# Patient Record
Sex: MALE | Race: WHITE | Employment: FULL TIME | RURAL
[De-identification: names, ages, dates, MRNs, and addresses within clinical notes are randomized per-mention and may not be internally consistent; named-entity substitution may affect disease eponyms.]

---

## 2020-07-14 ENCOUNTER — OFFICE VISIT (OUTPATIENT)
Dept: PRIMARY CARE CLINIC | Age: 50
End: 2020-07-14

## 2020-07-14 DIAGNOSIS — Z20.828 EXPOSURE TO SARS-ASSOCIATED CORONAVIRUS: Primary | ICD-10-CM

## 2020-07-14 NOTE — PROGRESS NOTES
Pt presents to the flu clinic today requesting a covid test. Pt denies symptoms but has had a possible exposure from work. He declined to be seen by a doctor today.  KT

## 2020-07-18 LAB — SARS-COV-2, NAA: NOT DETECTED

## 2020-08-11 ENCOUNTER — OFFICE VISIT (OUTPATIENT)
Dept: PRIMARY CARE CLINIC | Age: 50
End: 2020-08-11

## 2020-08-11 DIAGNOSIS — Z20.828 EXPOSURE TO SARS-ASSOCIATED CORONAVIRUS: Primary | ICD-10-CM

## 2020-08-11 NOTE — PROGRESS NOTES
Pt presents to the flu clinic for covid testing. 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 He denies sx and exposure at this time. He declined to see a doctor today.  KT

## 2020-08-13 LAB — SARS-COV-2, NAA: NOT DETECTED

## 2020-09-21 ENCOUNTER — OFFICE VISIT (OUTPATIENT)
Dept: PRIMARY CARE CLINIC | Age: 50
End: 2020-09-21

## 2020-09-21 DIAGNOSIS — Z20.828 EXPOSURE TO SARS-ASSOCIATED CORONAVIRUS: Primary | ICD-10-CM

## 2020-09-23 LAB — SARS-COV-2, NAA: NOT DETECTED

## 2020-09-30 PROBLEM — J30.9 CHRONIC ALLERGIC RHINITIS: Status: ACTIVE | Noted: 2020-09-30

## 2020-11-23 ENCOUNTER — OFFICE VISIT (OUTPATIENT)
Dept: PRIMARY CARE CLINIC | Age: 50
End: 2020-11-23

## 2020-11-23 DIAGNOSIS — Z20.822 ENCOUNTER FOR LABORATORY TESTING FOR COVID-19 VIRUS: Primary | ICD-10-CM

## 2020-11-25 ENCOUNTER — TELEPHONE (OUTPATIENT)
Dept: PRIMARY CARE CLINIC | Age: 50
End: 2020-11-25

## 2020-11-25 LAB — SARS-COV-2, NAA: NOT DETECTED

## 2020-12-14 ENCOUNTER — OFFICE VISIT (OUTPATIENT)
Dept: PRIMARY CARE CLINIC | Age: 50
End: 2020-12-14

## 2020-12-14 DIAGNOSIS — Z20.822 ENCOUNTER FOR LABORATORY TESTING FOR COVID-19 VIRUS: Primary | ICD-10-CM

## 2020-12-16 LAB — SARS-COV-2, NAA: NOT DETECTED

## 2021-01-06 ENCOUNTER — OFFICE VISIT (OUTPATIENT)
Dept: PRIMARY CARE CLINIC | Age: 51
End: 2021-01-06

## 2021-01-06 DIAGNOSIS — Z20.822 ENCOUNTER FOR LABORATORY TESTING FOR COVID-19 VIRUS: Primary | ICD-10-CM

## 2021-01-08 LAB — SARS-COV-2, NAA: NOT DETECTED

## 2021-02-04 ENCOUNTER — OFFICE VISIT (OUTPATIENT)
Dept: PRIMARY CARE CLINIC | Age: 51
End: 2021-02-04

## 2021-02-04 DIAGNOSIS — Z20.822 SUSPECTED 2019 NOVEL CORONAVIRUS INFECTION: ICD-10-CM

## 2021-02-04 DIAGNOSIS — Z11.59 SCREENING FOR VIRAL DISEASE: ICD-10-CM

## 2021-02-04 NOTE — PROGRESS NOTES
Pt presents to the flu clinic for covid testing. 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 He denies sx and exposure at this time. He declined to see a doctor today. KT        2021    Susan Don (:  1970) is a 48 y.o.  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 ASSESSMENT  Screening for COVID-19/ Viral disease    PLAN  POCT influenza testing - Not Indicated  COVID-19 sample collected and submitted. Patient given detailed CDC instructions contained within After Visit Summary. An  electronic signature was used to authenticate this note.     --[place]

## 2021-02-04 NOTE — PATIENT INSTRUCTIONS
Preventing the Spread of Coronavirus Disease 2019 in Homes and Residential Communities For the most recent information go to [email or web address] Prevention steps for People with confirmed or suspected COVID-19 (including persons under investigation) who do not need to be hospitalized 
and People with confirmed COVID-19 who were hospitalized and determined to be medically stable to go home Your healthcare provider and public health staff will evaluate whether you can be cared for at home. If it is determined that you do not need to be hospitalized and can be isolated at home, you will be monitored by staff from your local or state health department. You should follow the prevention steps below until a healthcare provider or local or state health department says you can return to your normal activities. Stay home except to get medical care People who are mildly ill with COVID-19 are able to isolate at home during their illness. You should restrict activities outside your home, except for getting medical care. Do not go to work, school, or public areas. Avoid using public transportation, ride-sharing, or taxis. Separate yourself from other people and animals in your home People: As much as possible, you should stay in a specific room and away from other people in your home. Also, you should use a separate bathroom, if available. Animals: You should restrict contact with pets and other animals while you are sick with COVID-19, just like you would around other people. Although there have not been reports of pets or other animals becoming sick with COVID-19, it is still recommended that people sick with COVID-19 limit contact with animals until more information is known about the virus. When possible, have another member of your household care for your animals while you are sick. If you are sick with COVID-19, avoid contact with your pet, including petting, snuggling, being kissed or licked, and sharing food. If you must care for your pet or be around animals while you are sick, wash your hands before and after you interact with pets and wear a facemask. Call ahead before visiting your doctor If you have a medical appointment, call the healthcare provider and tell them that you have or may have COVID-19. This will help the healthcare provider's office take steps to keep other people from getting infected or exposed. Wear a facemask You should wear a facemask when you are around other people (e.g., sharing a room or vehicle) or pets and before you enter a healthcare provider's office. If you are not able to wear a facemask (for example, because it causes trouble breathing), then people who live with you should not stay in the same room with you, or they should wear a facemask if they enter your room. Cover your coughs and sneezes Cover your mouth and nose with a tissue when you cough or sneeze. Throw used tissues in a lined trash can. Immediately wash your hands with soap and water for at least 20 seconds or, if soap and water are not available, clean your hands with an alcohol-based hand  that contains at least 60% alcohol. Clean your hands often Wash your hands often with soap and water for at least 20 seconds, especially after blowing your nose, coughing, or sneezing; going to the bathroom; and before eating or preparing food. If soap and water are not readily available, use an alcohol-based hand  with at least 60% alcohol, covering all surfaces of your hands and rubbing them together until they feel dry. Soap and water are the best option if hands are visibly dirty. Avoid touching your eyes, nose, and mouth with unwashed hands. Avoid sharing personal household items You should not share dishes, drinking glasses, cups, eating utensils, towels, or bedding with other people or pets in your home. After using these items, they should be washed thoroughly with soap and water. Clean all \"high-touch\" surfaces everyday High touch surfaces include counters, tabletops, doorknobs, bathroom fixtures, toilets, phones, keyboards, tablets, and bedside tables. Also, clean any surfaces that may have blood, stool, or body fluids on them. Use a household cleaning spray or wipe, according to the label instructions. Labels contain instructions for safe and effective use of the cleaning product including precautions you should take when applying the product, such as wearing gloves and making sure you have good ventilation during use of the product. Monitor your symptoms Seek prompt medical attention if your illness is worsening (e.g., difficulty breathing). Before seeking care, call your healthcare provider and tell them that you have, or are being evaluated for, COVID-19. Put on a facemask before you enter the facility. These steps will help the healthcare provider's office to keep other people in the office or waiting room from getting infected or exposed. Ask your healthcare provider to call the local or state health department. Persons who are placed under active monitoring or facilitated self-monitoring should follow instructions provided by their local health department or occupational health professionals, as appropriate. When working with your local health department check their available hours. If you have a medical emergency and need to call 911, notify the dispatch personnel that you have, or are being evaluated for COVID-19. If possible, put on a facemask before emergency medical services arrive. Discontinuing home isolation Patients with confirmed COVID-19 should remain under home isolation precautions until the risk of secondary transmission to others is thought to be low. The decision to discontinue home isolation precautions should be made on a case-by-case basis, in consultation with healthcare providers and Pending sale to [facility] and [facility] health departments. [facility] introduces iMedX patient portal. Now you can access parts of your medical record, email your doctor's office, and request medication refills online. 1. In your internet browser, go to [email or web address] 2. Click on the First Time User? Click Here link in the Sign In box. You will see the New Member Sign Up page. 3. Enter your iMedX Access Code exactly as it appears below. You will not need to use this code after youve completed the sign-up process. If you do not sign up before the expiration date, you must request a new code. · fitogram Access Code: Activation code not generated · Current fitogram Status: Active 4. Enter the last four digits of your Social Security Number (xxxx) and Date of Birth (mm/dd/yyyy) as indicated and click Submit. You will be taken to the next sign-up page. 5. Create a "[employer]t ID. This will be your fitogram login ID and cannot be changed, so think of one that is secure and easy to remember. 6. Create a fitogram password. You can change your password at any time. 7. Enter your Password Reset Question and Answer. This can be used at a later time if you forget your password. 8. Enter your e-mail address. You will receive e-mail notification when new information is available in [address] 9. Click Sign Up. You can now view and download portions of your medical record. 10. Click the Download Summary menu link to download a portable copy of your medical information. If you have questions, please visit the Frequently Asked Questions section of the fitogram website. Remember, fitogram is NOT to be used for urgent needs. For medical emergencies, dial 911. Now available from your iPhone and Android!

## 2021-02-06 LAB — SARS-COV-2, NAA: NOT DETECTED

## 2021-02-08 ENCOUNTER — DOCUMENTATION ONLY (OUTPATIENT)
Dept: PRIMARY CARE CLINIC | Age: 51
End: 2021-02-08

## 2021-02-22 ENCOUNTER — OFFICE VISIT (OUTPATIENT)
Dept: PRIMARY CARE CLINIC | Age: 51
End: 2021-02-22

## 2021-02-22 DIAGNOSIS — Z20.822 ENCOUNTER FOR LABORATORY TESTING FOR COVID-19 VIRUS: Primary | ICD-10-CM

## 2021-02-22 NOTE — PROGRESS NOTES
Pt presents to the flu clinic today requesting a covid test. Pt denies symptoms but has had a possible exposure. Pt declined to see a provider.  Verbal consent received to perform test. DEL

## 2021-02-24 LAB — SARS-COV-2, NAA: NOT DETECTED

## 2022-03-07 ENCOUNTER — VIRTUAL VISIT (OUTPATIENT)
Dept: FAMILY MEDICINE CLINIC | Age: 52
End: 2022-03-07
Payer: COMMERCIAL

## 2022-03-07 DIAGNOSIS — J01.00 ACUTE NON-RECURRENT MAXILLARY SINUSITIS: Primary | ICD-10-CM

## 2022-03-07 DIAGNOSIS — J20.9 ACUTE BRONCHITIS, UNSPECIFIED ORGANISM: ICD-10-CM

## 2022-03-07 PROCEDURE — 99441 PR PHYS/QHP TELEPHONE EVALUATION 5-10 MIN: CPT | Performed by: NURSE PRACTITIONER

## 2022-03-07 RX ORDER — PREDNISONE 10 MG/1
TABLET ORAL
Qty: 21 TABLET | Refills: 0 | Status: SHIPPED | OUTPATIENT
Start: 2022-03-07 | End: 2022-09-16 | Stop reason: ALTCHOICE

## 2022-03-07 RX ORDER — CEFDINIR 300 MG/1
300 CAPSULE ORAL 2 TIMES DAILY
Qty: 20 CAPSULE | Refills: 0 | Status: SHIPPED | OUTPATIENT
Start: 2022-03-07 | End: 2022-03-17

## 2022-03-07 RX ORDER — ALBUTEROL SULFATE 90 UG/1
2 AEROSOL, METERED RESPIRATORY (INHALATION)
Qty: 18 G | Refills: 0 | Status: SHIPPED | OUTPATIENT
Start: 2022-03-07

## 2022-03-07 NOTE — PROGRESS NOTES
1. Have you been to the ER, urgent care clinic since your last visit? Hospitalized since your last visit? No    2. Have you seen or consulted any other health care providers outside of the [address] [place] [address] since your last visit? Include any pap smears or colon screening.  Yes When: shoulder surgery  2-2022

## 2022-03-07 NOTE — PROGRESS NOTES
Mariia Giraldo is a 46 y.o. male evaluated via telephone on 3/7/2022. Consent:  He and/or health care decision maker is aware that that he may receive a bill for this telephone service, depending on his insurance coverage, and has provided verbal consent to proceed: Yes    CC: cold symptoms    HPI  Mr. Carl Main is a 52yo male who is a patient of Dr Christiano Chun who presents via telephone encounter today with c/o \"the usual spring sinus problem. \" He reports sinus pressure under the eyes, nasal drainage with green mucus, sore throat, hoarse voice, and cough. Symptoms started a week ago. Denies SOB or CP. He has had 2 covid tests done, both neg. Hx of sinusits and bronchitis. Also has hx of seasonal allergies and usually takes Zyrtec daily in the spring. PLAN  Acute sinusitis with bronchitis  Script sent for Cefdinir 300mg BID x 10 days, prednisone 10mg tab dose pack, and Albuterol inhaler 2 puffs Q4 hours prn chest tightness/ wheezing/ SOB  F/U prn if symptoms worsen or do not improve. Documentation:  I communicated with the patient and/or health care decision maker about the plan of care as noted above. I affirm this is a Patient Initiated Episode with an Established Patient who has not had a related appointment within my department in the past 7 days or scheduled within the next 24 hours.     Total Time: minutes: 5-10 minutes    Note: not billable if this call serves to triage the patient into an appointment for the relevant concern      Keisha Kilgore NP

## 2022-03-19 PROBLEM — J30.9 CHRONIC ALLERGIC RHINITIS: Status: ACTIVE | Noted: 2020-09-30

## 2022-09-16 ENCOUNTER — OFFICE VISIT (OUTPATIENT)
Dept: FAMILY MEDICINE CLINIC | Age: 52
End: 2022-09-16
Payer: COMMERCIAL

## 2022-09-16 VITALS
HEART RATE: 67 BPM | TEMPERATURE: 97.2 F | RESPIRATION RATE: 16 BRPM | OXYGEN SATURATION: 97 % | SYSTOLIC BLOOD PRESSURE: 139 MMHG | WEIGHT: 276.38 LBS | DIASTOLIC BLOOD PRESSURE: 85 MMHG | HEIGHT: 74 IN | BODY MASS INDEX: 35.47 KG/M2

## 2022-09-16 DIAGNOSIS — M70.22 OLECRANON BURSITIS OF LEFT ELBOW: Primary | ICD-10-CM

## 2022-09-16 PROCEDURE — 20610 DRAIN/INJ JOINT/BURSA W/O US: CPT | Performed by: FAMILY MEDICINE

## 2022-09-16 RX ORDER — TRIAMCINOLONE ACETONIDE 40 MG/ML
40 INJECTION, SUSPENSION INTRA-ARTICULAR; INTRAMUSCULAR ONCE
Status: COMPLETED | OUTPATIENT
Start: 2022-09-16 | End: 2022-09-16

## 2022-09-16 RX ADMIN — TRIAMCINOLONE ACETONIDE 40 MG: 40 INJECTION, SUSPENSION INTRA-ARTICULAR; INTRAMUSCULAR at 09:19

## 2022-09-16 NOTE — PROGRESS NOTES
9/16/2022      Chief Complaint   Patient presents with    Elbow Swelling     Left     Gland Swelling     Under Left arm          History of Present Illness:        Tone Cruz is a 46 y.o. male with painless swelling of L elbow for a week. 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 No hx of gout, arthralgias. No systemic sxs. Allergies   Allergen Reactions    Penicillins Unknown (comments)       Current Outpatient Medications   Medication Sig    metoprolol succinate (TOPROL-XL) 25 mg XL tablet TK 1 T PO QD    albuterol (PROVENTIL HFA, VENTOLIN HFA, PROAIR HFA) 90 mcg/actuation inhaler Take 2 Puffs by inhalation every four (4) hours as needed for Wheezing. (Patient not taking: Reported on 9/16/2022)    pseudoephedrine (SUDAFED) 60 mg tablet Take 1 Tab by mouth every six (6) hours as needed for Congestion. (Patient not taking: Reported on 9/16/2022)    ipratropium (ATROVENT) 42 mcg (0.06 %) nasal spray 1 [place] by Both Nostrils route four (4) times daily. Indications: runny nose (Patient not taking: Reported on 9/16/2022)     No current facility-administered medications for this visit. Physical Examination:    Visit Vitals  /85 (BP 1 Location: Left upper arm, BP Patient Position: Sitting, BP Cuff Size: Adult)   Pulse 67   Temp 97.2 °F (36.2 °C) (Oral)   Resp 16   Ht 6' 2\" (1.88 m)   Wt 276 lb 6 oz (125.4 kg)   SpO2 97%   BMI 35.48 kg/m²      General:  Alert, cooperative, no distress. HEENT:  Normocephalic, without obvious abnormality, atraumatic. Conjunctivae/corneas clear. Pupils equal, round, reactive to light. Extraocular movements intact. TMs and external canals normal bilaterally. Nasal mucosa and oropharynx clear. Lungs: Clear to auscultation bilaterally. Chest wall:  No tenderness or deformity. Heart:  Regular rate and rhythm, S1, S2 normal, no murmur, click, rub, or gallop. Abdomen:   Soft, non-tender. Bowel sounds normal. No masses. No organomegaly.    Extremities: Large L olecranon bursa effusion. No redness, warmth or tenderness   Pulses: 2+ and symmetric all extremities. Skin: Skin color, texture, turgor normal. No rashes or lesions. Lymph nodes: Cervical, supraclavicular, and axillary nodes normal.   Neurologic: CNII-XII intact. Normal strength, sensation, and reflexes throughout. ASSESSMENT AND PLAN    1. Olecranon bursitis of left elbow  Fol informed consent and sterile prep, 8 cc of heme tinged clear fluid aspirated. Bursa then injected with 40 mg triamcinalone. Compression and NSAIDs. Return for recurrence.   - IN DRAIN/INJECT LARGE JOINT/BURSA  - triamcinolone acetonide (KENALOG-40) 40 mg/mL injection 40 mg          Orders Placed This Encounter    20610 - DRAIN/INJECT LARGE JOINT/BURSA    triamcinolone acetonide (KENALOG-40) 40 mg/mL injection 40 mg           Kandy Desai MD

## 2022-09-16 NOTE — PROGRESS NOTES
1. \"Have you been to the ER, urgent care clinic since your last visit? Hospitalized since your last visit? \" No    2. \"Have you seen or consulted any other health care providers outside of the [address] [place] [address] since your last visit? \" No     3. For patients aged 39-70: Has the patient had a colonoscopy / FIT/ Cologuard? Yes - no Care Gap present      If the patient is female:    4. For patients aged 41-77: Has the patient had a mammogram within the past 2 years? N/A      5. For patients aged 21-65: Has the patient had a pap smear? [facility]  OFFICE PROCEDURE PROGRESS NOTE        Chart reviewed for the following:   Estrella Delaney LPN, have reviewed the History, Physical and updated the Allergic reactions for [address] [place] [address] performed immediately prior to start of procedure:   Estrella Delaney LPN, have performed the following reviews on [facility] prior to the start of the procedure:            * Patient was identified by name and date of birth   * Agreement on procedure being performed was verified  * Risks and Benefits explained to the patient by Dennis Khalil MD  * Procedure site verified and marked as necessary  * Patient was positioned for comfort  * Consent was signed and verified     Time: 9:10am      Date of procedure: 9/16/2022    Procedure performed by:  Dennis Khalil MD    Provider assisted by: Duke Quiroga LPN    Patient assisted by: self    Pre Procedural Pain Scale: 0    Procedure start time:  9:15am    Procedure end time:  9:18am    How tolerated by patient: tolerated the procedure well with no complications    Post Procedural Pain Scale: 0 - No Hurt    Comments: Left Olecranon Bursa Aspiration    Post op instructions and patient education reviewed. Patient states understanding.     Copy of discharge instructions given to patient in AVS.

## 2022-11-25 ENCOUNTER — OFFICE VISIT (OUTPATIENT)
Dept: FAMILY MEDICINE CLINIC | Age: 52
End: 2022-11-25
Payer: COMMERCIAL

## 2022-11-25 VITALS
HEART RATE: 60 BPM | OXYGEN SATURATION: 99 % | SYSTOLIC BLOOD PRESSURE: 124 MMHG | BODY MASS INDEX: 35.39 KG/M2 | WEIGHT: 275.8 LBS | TEMPERATURE: 97.9 F | RESPIRATION RATE: 16 BRPM | DIASTOLIC BLOOD PRESSURE: 82 MMHG | HEIGHT: 74 IN

## 2022-11-25 DIAGNOSIS — Z23 NEEDS FLU SHOT: Primary | ICD-10-CM

## 2022-11-25 PROCEDURE — 90686 IIV4 VACC NO PRSV 0.5 ML IM: CPT | Performed by: FAMILY MEDICINE

## 2022-11-25 PROCEDURE — 99213 OFFICE O/P EST LOW 20 MIN: CPT | Performed by: FAMILY MEDICINE

## 2022-11-25 PROCEDURE — 90471 IMMUNIZATION ADMIN: CPT | Performed by: FAMILY MEDICINE

## 2022-11-25 NOTE — PATIENT INSTRUCTIONS
Vaccine Information Statement    Influenza (Flu) Vaccine (Live, Intranasal): What You Need to Know    Many vaccine information statements are available in [place] and other languages. See www.immunize.org/vis. Hojas de información sobre vacunas están disponibles en español y en muchos otros idiomas. Visite www.immunize.org/vis. 1. Why get vaccinated? Influenza vaccine can prevent influenza (flu). Flu is a contagious disease that spreads around the [facility] every year, usually between October and May. Anyone can get the flu, but it is more dangerous for some people. Infants and young children, people 72years of age and older, pregnant people, and people with certain health conditions or a weakened immune system are at greatest risk of flu complications. Pneumonia, bronchitis, sinus infections, and ear infections are examples of flu-related complications. If you have a medical condition, such as heart disease, cancer, or diabetes, flu can make it worse. Flu can cause fever and chills, sore throat, muscle aches, fatigue, cough, headache, and runny or stuffy nose. Some people may have vomiting and diarrhea, though this is more common in children than adults. In an average year, thousands of people in the [facility] die from flu, and many more are hospitalized. Flu vaccine prevents millions of illnesses and flu-related visits to the doctor each year. 2. Live, attenuated influenza vaccine     CDC recommends everyone 6 months and older get vaccinated every flu season. Children 6 months through 6years of age may need 2 doses during a single flu season. Everyone else needs only 1 dose each flu season. Live, attenuated influenza vaccine (called LAIV) is a nasal spray vaccine that may be given to non-pregnant people 2 through 52years of age. It takes about 2 weeks for protection to develop after vaccination. There are many flu viruses, and they are always changing.  Each year a new flu vaccine is made to protect against the influenza viruses believed to be likely to cause disease in the upcoming flu season. Even when the vaccine doesnt exactly match these viruses, it may still provide some protection. Influenza vaccine does not cause flu. Influenza vaccine may be given at the same time as other vaccines. 3. Talk with your health care provider    Tell your vaccination provider if the person getting the vaccine:  Is younger than 2 years or older than 52years of age  Is pregnant. Live, attenuated influenza vaccine is not recommended for pregnant people  Has had an allergic reaction after a previous dose of influenza vaccine, or has any severe, life-threatening allergies  Is a child or adolescent 2 through 16years of age who is receiving aspirin or aspirin- or salicylate-containing products  Has a weakened immune system  Is a child 3through 3years old who has asthma or a history of wheezing in the past 12 months  Is 5 years or older and has asthma  Has taken influenza antiviral medication in the last 3 weeks  Cares for severely immunocompromised people who require a protected environment  Has other underlying medical conditions that can put people at higher risk of serious flu complications (such as lung disease, heart disease, kidney disease like diabetes, kidney or liver disorders, neurologic or neuromuscular or metabolic disorders)  Does not have a spleen, or has a non-functioning spleen  Has a cochlear implant  Has a cerebrospinal fluid leak (a leak of the fluid that surrounds the brain to the nose, throat, ear, or some other location in the head)  Has had Guillain-Barré Syndrome within 6 weeks after a previous dose of influenza vaccine    In some cases, your health care provider may decide to postpone influenza vaccination until a future visit.       For some patients, a different type of influenza vaccine (inactivated or recombinant influenza vaccine) might be more appropriate than live, attenuated influenza vaccine. People with minor illnesses, such as a cold, may be vaccinated. People who are moderately or severely ill should usually wait until they recover before getting influenza vaccine. Your health care provider can give you more information. 4. Risks of a vaccine reaction    Runny nose or nasal congestion, wheezing, and headache can happen after LAIV vaccination. Vomiting, muscle aches, fever, sore throat, and cough are other possible side effects. If these problems occur, they usually begin soon after vaccination and are mild and short-lived. As with any medicine, there is a very remote chance of a vaccine causing a severe allergic reaction, other serious injury, or death. 5. What if there is a serious problem? An allergic reaction could occur after the vaccinated person leaves the clinic. If you see signs of a severe allergic reaction (hives, swelling of the face and throat, difficulty breathing, a fast heartbeat, dizziness, or weakness), call 9-1-1 and get the person to the nearest hospital.    For other signs that concern you, call your health care provider. Adverse reactions should be reported to the Vaccine Adverse Event Reporting System (VAERS). Your health care provider will usually file this report, or you can do it yourself. Visit the VAERS website at www.vaers. hhs.gov or call 2-356.729.8567. VAERS is only for reporting reactions, and VAERS staff members do not give medical advice. 6. The National Vaccine Injury Compensation Program    The [facility] Vaccine Injury Compensation Program (VICP) is a federal program that was created to compensate people who may have been injured by certain vaccines. Claims regarding alleged injury or death due to vaccination have a time limit for filing, which may be as short as two years.  Visit the VICP website at www.hrsa.gov/vaccinecompensation or call 6-127.595.8821 to learn about the program and about filing a claim. 7. How can I learn more? Ask your health care provider. Call your local or state health department. Visit the website of the Food and Drug Administration (FDA) for vaccine package inserts and additional information at www.fda.gov/vaccines-blood-biologics/vaccines. Contact the Centers for Disease Control and Prevention (CDC): Call 4-139.982.9028 (1-800-CDC-INFO) or  Visit CDCs influenza website at www.cdc.gov/flu. Vaccine Information Statement   Live, Attenuated Influenza Vaccine   8/6/2021  42 SHARON Yun [id number]   Department of Health and Human Services  Centers for Disease Control and Prevention    Office Use Only

## 2022-11-25 NOTE — PROGRESS NOTES
Patient was administered Flu shot in right   deltoid via IM. Patient tolerated Flu shot well. Medication information reviewed with patient, patient states understanding. Patient to resume routine medications at home. Patient given copy of AVS and VIIS with medication information and instructions for home. VIIS reviewed with patient and patient states understanding.

## 2022-11-25 NOTE — PROGRESS NOTES
Carmen Dennison is a 46 y.o. male who presents with the following:  No chief complaint on file. Patient had olecranon bursitis which was drained by a doctor over in [place] and foot patient comes in today complaining that the elbow is not back to normal and he would like it drained again. Allergies   Allergen Reactions    Penicillins Unknown (comments)       Current Outpatient Medications   Medication Sig    metoprolol succinate (TOPROL-XL) 25 mg XL tablet TK 1 T PO QD     No current facility-administered medications for this visit. History reviewed. No pertinent past medical history. Past Surgical History:   Procedure Laterality Date    HX SHOULDER ARTHROSCOPY Left        History reviewed. No pertinent family history. Social History     Socioeconomic History    Marital status:    Tobacco Use    Smoking status: Former     Types: Cigarettes     Quit date:      Years since quittin.9    Smokeless tobacco: Never   Vaping Use    Vaping Use: Never used   Substance and Sexual Activity    Alcohol use: Yes    Drug use: No    Sexual activity: Yes     Social Determinants of Health     Financial Resource Strain: Low Risk     Difficulty of Paying Living Expenses: Not hard at all   Food Insecurity: No Food Insecurity    Worried About Running Out of Food in the Last Year: Never true    Ran Out of Food in the Last Year: Never true       Review of Systems   Constitutional:  Negative for chills, fever, malaise/fatigue and weight loss. HENT:  Negative for congestion, hearing loss, sore throat and tinnitus. Eyes:  Negative for blurred vision, pain and discharge. Respiratory:  Negative for cough, shortness of breath and wheezing. Cardiovascular:  Negative for chest pain, palpitations, orthopnea, claudication and leg swelling. Gastrointestinal:  Negative for abdominal pain, constipation and heartburn. Genitourinary:  Negative for dysuria, frequency and urgency.    Musculoskeletal: Negative for falls, joint pain and myalgias. Skin:  Negative for itching and rash. Neurological:  Negative for dizziness, tingling, tremors and headaches. Endo/Heme/Allergies:  Negative for environmental allergies and polydipsia. Psychiatric/Behavioral:  Negative for depression and substance abuse. The patient is not nervous/anxious. Visit Vitals  /82 (BP 1 Location: Left upper arm)   Pulse 60   Temp 97.9 °F (36.6 °C) (Oral)   Resp 16   Ht 6' 2\" (1.88 m)   Wt 275 lb 12.8 oz (125.1 kg)   SpO2 99%   BMI 35.41 kg/m²     Physical Exam  Vitals reviewed. Constitutional:       General: He is not in acute distress. Appearance: Normal appearance. He is well-developed. He is not ill-appearing or toxic-appearing. HENT:      Head: Normocephalic and atraumatic. Right Ear: Tympanic membrane, ear canal and external ear normal.      Left Ear: Tympanic membrane, ear canal and external ear normal.      Nose: Nose normal. No congestion or rhinorrhea. Mouth/Throat:      Mouth: Mucous membranes are moist.      Pharynx: No oropharyngeal exudate or posterior oropharyngeal erythema. Eyes:      General:         Right eye: No discharge. Left eye: No discharge. Extraocular Movements: Extraocular movements intact. Conjunctiva/sclera: Conjunctivae normal.      Pupils: Pupils are equal, round, and reactive to light. Comments: Pupil iris and anterior chamber normal.   Neck:      Trachea: No tracheal deviation. Cardiovascular:      Rate and Rhythm: Normal rate and regular rhythm. Pulses: Normal pulses. Heart sounds: Normal heart sounds. No murmur heard. No friction rub. No gallop. Pulmonary:      Effort: Pulmonary effort is normal. No respiratory distress. Breath sounds: Normal breath sounds. No wheezing, rhonchi or rales. Chest:      Chest wall: No tenderness. Abdominal:      General: Bowel sounds are normal. There is no distension.       Palpations: Abdomen is soft. There is no mass. Tenderness: There is no abdominal tenderness. There is no guarding or rebound. Hernia: No hernia is present. Musculoskeletal:         General: Swelling present. No tenderness. Normal range of motion. Cervical back: Normal range of motion and neck supple. Comments: Patient has swelling and the tissue around the olecranon bursa with minimal amount of fluid in the area and I have suggested that he could use an NSAID it like Advil or Aleve and ice the area keeping a protective lizbeth cloth over it for 5 to 10 minutes 4 times a day and that would help get the swelling to go down which could take as long as 4 to 6 weeks. I did not advise any further manipulation of the area by using injection therapy as there was a good likelihood at this point that he could become infected which would end up requiring surgery to remove the bursa. Lymphadenopathy:      Cervical: No cervical adenopathy. Skin:     General: Skin is warm and dry. Coloration: Skin is not pale. Findings: No erythema or rash. Neurological:      General: No focal deficit present. Mental Status: He is alert and oriented to person, place, and time. Cranial Nerves: No cranial nerve deficit. Sensory: No sensory deficit. Motor: No abnormal muscle tone. Deep Tendon Reflexes: Reflexes are normal and symmetric. Reflexes normal.      Comments: Cranial nerves II through XII intact sensory and motor. Deep tendon reflexes in the biceps triceps knee and ankle are normal and bilaterally symmetrical.   Psychiatric:         Mood and Affect: Mood normal.         Behavior: Behavior normal.         ICD-10-CM ICD-9-CM    1.  Needs flu shot  Z23 V04.81 INFLUENZA, FLUARIX, FLULAVAL, FLUZONE (AGE 6 MO+), AFLURIA(AGE 3Y+) IM, PF, 0.5 ML          Orders Placed This Encounter    Influenza, FLUARIX, FLULAVAL, Fluzone (age 10 mo+), AFLURIA (age 3y+) IM, PF, 0.5 mL           Christina Lin MD

## 2023-03-01 ENCOUNTER — LAB ONLY (OUTPATIENT)
Dept: FAMILY MEDICINE CLINIC | Age: 53
End: 2023-03-01
Payer: COMMERCIAL

## 2023-03-01 ENCOUNTER — VIRTUAL VISIT (OUTPATIENT)
Dept: FAMILY MEDICINE CLINIC | Age: 53
End: 2023-03-01

## 2023-03-01 DIAGNOSIS — U07.1 COVID-19: Primary | ICD-10-CM

## 2023-03-01 DIAGNOSIS — R50.9 FEVER, UNSPECIFIED FEVER CAUSE: Primary | ICD-10-CM

## 2023-03-01 DIAGNOSIS — R05.1 ACUTE COUGH: ICD-10-CM

## 2023-03-01 LAB
EXP DATE SOLUTION: ABNORMAL
EXP DATE SWAB: ABNORMAL
FLUAV+FLUBV AG NOSE QL IA.RAPID: NEGATIVE
FLUAV+FLUBV AG NOSE QL IA.RAPID: NEGATIVE
LOT NUMBER SOLUTION: ABNORMAL
LOT NUMBER SWAB: ABNORMAL
SARS-COV-2 RNA POC: POSITIVE
VALID INTERNAL CONTROL?: YES

## 2023-03-01 PROCEDURE — 87635 SARS-COV-2 COVID-19 AMP PRB: CPT | Performed by: FAMILY MEDICINE

## 2023-03-01 RX ORDER — IPRATROPIUM BROMIDE 42 UG/1
1 SPRAY, METERED NASAL 4 TIMES DAILY
Qty: 15 ML | Refills: 0 | Status: SHIPPED | OUTPATIENT
Start: 2023-03-01

## 2023-03-01 RX ORDER — PSEUDOEPHEDRINE HYDROCHLORIDE 60 MG/1
60 TABLET ORAL
Qty: 40 TABLET | Refills: 0 | Status: SHIPPED | OUTPATIENT
Start: 2023-03-01 | End: 2023-03-11

## 2023-03-18 DIAGNOSIS — U07.1 COVID-19: ICD-10-CM

## 2023-03-20 RX ORDER — IPRATROPIUM BROMIDE 42 UG/1
SPRAY, METERED NASAL
Qty: 15 ML | Refills: 0 | Status: SHIPPED | OUTPATIENT
Start: 2023-03-20

## 2023-04-20 ENCOUNTER — OFFICE VISIT (OUTPATIENT)
Dept: FAMILY MEDICINE CLINIC | Age: 53
End: 2023-04-20
Payer: COMMERCIAL

## 2023-04-20 VITALS
RESPIRATION RATE: 16 BRPM | BODY MASS INDEX: 35.29 KG/M2 | SYSTOLIC BLOOD PRESSURE: 136 MMHG | OXYGEN SATURATION: 98 % | HEIGHT: 74 IN | WEIGHT: 275 LBS | DIASTOLIC BLOOD PRESSURE: 89 MMHG | HEART RATE: 79 BPM | TEMPERATURE: 98.6 F

## 2023-04-20 DIAGNOSIS — R10.9 ABDOMINAL CRAMPING: ICD-10-CM

## 2023-04-20 DIAGNOSIS — R19.5 WATERY STOOLS: ICD-10-CM

## 2023-04-20 DIAGNOSIS — J01.90 ACUTE NON-RECURRENT SINUSITIS, UNSPECIFIED LOCATION: Primary | ICD-10-CM

## 2023-04-20 PROCEDURE — 36415 COLL VENOUS BLD VENIPUNCTURE: CPT | Performed by: NURSE PRACTITIONER

## 2023-04-20 PROCEDURE — 99214 OFFICE O/P EST MOD 30 MIN: CPT | Performed by: NURSE PRACTITIONER

## 2023-04-20 RX ORDER — AZITHROMYCIN 250 MG/1
TABLET, FILM COATED ORAL
Qty: 6 TABLET | Refills: 0 | Status: SHIPPED | OUTPATIENT
Start: 2023-04-20 | End: 2023-04-25

## 2023-04-20 NOTE — PROGRESS NOTES
Identified pt with two pt identifiers(name and ). Reviewed record in preparation for visit and have obtained necessary documentation. Chief Complaint   Patient presents with    Abdominal Pain     With loose stools      Cold Symptoms       1. \"Have you been to the ER, urgent care clinic since your last visit? Hospitalized since your last visit? \" No    2. \"Have you seen or consulted any other health care providers outside of the [address] [place] [address] since your last visit? \" No     3. For patients aged 39-70: Has the patient had a colonoscopy / FIT/ Cologuard? NO - stated doctor wanted to see him again in two years      If the patient is female:    4. For patients aged 41-77: Has the patient had a mammogram within the past 2 years? No - based on age        11. For patients aged 21-65: Has the patient had a pap smear?  NA - based on age or sex

## 2023-04-20 NOTE — PROGRESS NOTES
Subjective:     CC: abdominal pain, loose stool, cold symptoms    Virgina Jeans is a 48 y.o. male who is a patient of Dr Senait Mclain, who presents today with c/o abdominal cramping, watery diarrhea, and chills that started 11 days ago after he returned home from [place]. He denies any blood in the stool. He denies any fever, nausea, vomiting, dizziness, CP, or SOB. He does feel fatigued and a little achy. No rashes. His wife and daughter accompanied him on the trip and they have only had a mild upset stomach. He also mentions he has been suffering from allergies due to the pollen outside and feels he has developed a sinus infection. He reports nasal congestion and headaches. Patient Active Problem List   Diagnosis Code    Chronic allergic rhinitis J30.9       No past medical history on file.       Current Outpatient Medications:     ipratropium (ATROVENT) 42 mcg (0.06 %) nasal spray, USE 1 SPRAY IN EACH NOSTRIL FOUR TIMES DAILY FOR RUNNY NOSE, Disp: 15 mL, Rfl: 0    nirmatrelvir-ritonavir (PAXLOVID) 300 mg (150 mg x 2)-100 mg, Take 2 nirmatrelvir and 1 ritonavir twice daily until finished  Indications: COVID-19 (emergency use authorization), Disp: 1 Box, Rfl: 0    metoprolol succinate (TOPROL-XL) 25 mg XL tablet, TK 1 T PO QD, Disp: , Rfl: 3    Allergies   Allergen Reactions    Penicillins Unknown (comments)       Past Surgical History:   Procedure Laterality Date    HX SHOULDER ARTHROSCOPY Left        Social History     Tobacco Use   Smoking Status Former    Types: Cigarettes    Quit date:     Years since quittin.3   Smokeless Tobacco Never       Social History     Socioeconomic History    Marital status:    Tobacco Use    Smoking status: Former     Types: Cigarettes     Quit date:      Years since quittin.3    Smokeless tobacco: Never   Vaping Use    Vaping Use: Never used   Substance and Sexual Activity    Alcohol use: Yes    Drug use: No    Sexual activity: Yes     Social Determinants of Health     Financial Resource Strain: Low Risk     Difficulty of Paying Living Expenses: Not hard at all   Food Insecurity: No Food Insecurity    Worried About Running Out of Food in the Last Year: Never true    Ran Out of Food in the Last Year: Never true       No family history on file. ROS:  Gen: denies fever, +chills and fatigue  HEENT:+ H/A, PND, and nasal congestion, denies ear pain or sore throat  Resp: denies dyspnea, cough, or wheezing  CV: denies chest pain, pressure, or palpitations  Extremeties: denies edema  GI[de-identified] + abdominal cramping with watery diarrhea, denies nausea, vomiting, or blood in the stool  : denies dysuria, hematuria, urinary frequency or urgency  Musculoskeletal: +mild body aches  Neuro: denies numbness/tingling or dizziness  Skin: denies rashes or new lesions       Objective:     Visit Vitals  /89 (BP 1 Location: Left upper arm)   Pulse 79   Temp 98.6 °F (37 °C) (Oral)   Resp 16   Ht 6' 2\" (1.88 m)   Wt 275 lb (124.7 kg)   SpO2 98%   BMI 35.31 kg/m²       General: Alert and oriented. No acute distress. Well nourished. HEENT :  Ears:TMs normal. Canals clear. Eyes: Sclera white, conjunctiva clear. PERRLA. Extra ocular movements intact. Nose: Patent. Nasal mucosa pink, and edematous, with clear drainage. Mouth: Pharynx erythematous, without exudate   Neck: Supple with FROM. No lymphadenopathy  Lungs: Breathing even and unlabored. All lobes clear to auscultation bilaterally   Heart :RRR, S1 and S2 normal intensity, no extra heart sounds  Extremities: Non-edematous  Abdomen: Soft and non-distended. Bowel sounds active. No tenderness to palpation, no masses. Neuro: Cranial nerves grossly normal.  Psych: Mood and thought content appropriate for situation. Dressed appropriately and with good hygiene. Skin: Warm, dry, and intact. No lesions or discoloration.       Assessment/ Plan:     Acute sinusitis  Start Azithromycin 250mg- take 2 tabs po x 1 today then take 1 tab daily x 4 days  F/U prn if symptoms worsen or do not improve. Abdominal cramping with watery diarrhea  Suspect infectious gastroenteritis  Stool culture ordered  Stool testing for giardia, ova, and parasites ordered  Check CBC and CMP  He is drinking lots of fluids to prevent dehydration  He was advised to go to the ER for any blood in the stool, high fever, lightheadedness, or other emergencies  We will call him will test results and develop a plan of care- The Zpack for his sinus infection may help depending on the pathogen present. F/U pending test results  Of note he is going out of town to [facility] in [address] and written instructions (see AVS) provided. Patient expresses understanding of diagnosis and treatment plan. Health Maintenance Due   Topic Date Due    Hepatitis C Screening  Never done    Lipid Screen  Never done    Shingles Vaccine (1 of 2) Never done    DTaP/Tdap/Td series (2 - Td or Tdap) 10/28/2020    COVID-19 Vaccine (4 - Booster for Pfizer series) 02/09/2022               Regina Riggs, ANDREIA

## 2023-04-21 LAB
ALBUMIN SERPL-MCNC: 4.2 G/DL (ref 3.5–5)
ALBUMIN/GLOB SERPL: 1.2 (ref 1.1–2.2)
ALP SERPL-CCNC: 59 U/L (ref 45–117)
ALT SERPL-CCNC: 36 U/L (ref 12–78)
ANION GAP SERPL CALC-SCNC: 6 MMOL/L (ref 5–15)
AST SERPL-CCNC: 21 U/L (ref 15–37)
BILIRUB SERPL-MCNC: 0.5 MG/DL (ref 0.2–1)
BUN SERPL-MCNC: 16 MG/DL (ref 6–20)
BUN/CREAT SERPL: 15 (ref 12–20)
CALCIUM SERPL-MCNC: 9.2 MG/DL (ref 8.5–10.1)
CHLORIDE SERPL-SCNC: 103 MMOL/L (ref 97–108)
CO2 SERPL-SCNC: 27 MMOL/L (ref 21–32)
CREAT SERPL-MCNC: 1.1 MG/DL (ref 0.7–1.3)
ERYTHROCYTE [DISTWIDTH] IN BLOOD BY AUTOMATED COUNT: 13.5 % (ref 11.5–14.5)
GLOBULIN SER CALC-MCNC: 3.5 G/DL (ref 2–4)
GLUCOSE SERPL-MCNC: 100 MG/DL (ref 65–100)
HCT VFR BLD AUTO: 44.1 % (ref 36.6–50.3)
HGB BLD-MCNC: 14.6 G/DL (ref 12.1–17)
MCH RBC QN AUTO: 32.1 PG (ref 26–34)
MCHC RBC AUTO-ENTMCNC: 33.1 G/DL (ref 30–36.5)
MCV RBC AUTO: 96.9 FL (ref 80–99)
NRBC # BLD: 0 K/UL (ref 0–0.01)
NRBC BLD-RTO: 0 PER 100 WBC
PLATELET # BLD AUTO: 262 K/UL (ref 150–400)
PMV BLD AUTO: 10.5 FL (ref 8.9–12.9)
POTASSIUM SERPL-SCNC: 4.7 MMOL/L (ref 3.5–5.1)
PROT SERPL-MCNC: 7.7 G/DL (ref 6.4–8.2)
RBC # BLD AUTO: 4.55 M/UL (ref 4.1–5.7)
SODIUM SERPL-SCNC: 136 MMOL/L (ref 136–145)
WBC # BLD AUTO: 8.6 K/UL (ref 4.1–11.1)

## 2023-04-26 LAB
CAMPYLOBACTER STL CULT: NORMAL
E COLI SXT STL QL IA: NEGATIVE
G LAMBLIA AG STL QL IA: NEGATIVE
O+P STL CONC: NORMAL
SALM + SHIG STL CULT: NORMAL
SPECIMEN SOURCE: NORMAL

## 2023-05-23 RX ORDER — METOPROLOL SUCCINATE 25 MG/1
1 TABLET, EXTENDED RELEASE ORAL DAILY
COMMUNITY
Start: 2019-08-09

## 2023-05-23 RX ORDER — IPRATROPIUM BROMIDE 42 UG/1
SPRAY, METERED NASAL
COMMUNITY
Start: 2023-03-20

## 2023-10-16 ENCOUNTER — OFFICE VISIT (OUTPATIENT)
Dept: FAMILY MEDICINE CLINIC | Age: 53
End: 2023-10-16
Payer: COMMERCIAL

## 2023-10-16 VITALS
RESPIRATION RATE: 18 BRPM | TEMPERATURE: 98 F | BODY MASS INDEX: 34.91 KG/M2 | HEIGHT: 74 IN | WEIGHT: 272 LBS | SYSTOLIC BLOOD PRESSURE: 125 MMHG | OXYGEN SATURATION: 97 % | DIASTOLIC BLOOD PRESSURE: 82 MMHG | HEART RATE: 61 BPM

## 2023-10-16 DIAGNOSIS — B96.89 ACUTE BACTERIAL SINUSITIS: Primary | ICD-10-CM

## 2023-10-16 DIAGNOSIS — J01.90 ACUTE BACTERIAL SINUSITIS: Primary | ICD-10-CM

## 2023-10-16 PROCEDURE — 99213 OFFICE O/P EST LOW 20 MIN: CPT | Performed by: PHYSICIAN ASSISTANT

## 2023-10-16 RX ORDER — CLINDAMYCIN HYDROCHLORIDE 300 MG/1
300 CAPSULE ORAL 3 TIMES DAILY
Qty: 21 CAPSULE | Refills: 0 | Status: SHIPPED | OUTPATIENT
Start: 2023-10-16 | End: 2023-10-23

## 2023-10-16 SDOH — ECONOMIC STABILITY: FOOD INSECURITY: WITHIN THE PAST 12 MONTHS, THE FOOD YOU BOUGHT JUST DIDN'T LAST AND YOU DIDN'T HAVE MONEY TO GET MORE.: NEVER TRUE

## 2023-10-16 SDOH — ECONOMIC STABILITY: HOUSING INSECURITY
IN THE LAST 12 MONTHS, WAS THERE A TIME WHEN YOU DID NOT HAVE A STEADY PLACE TO SLEEP OR SLEPT IN A SHELTER (INCLUDING NOW)?: NO

## 2023-10-16 SDOH — ECONOMIC STABILITY: INCOME INSECURITY: HOW HARD IS IT FOR YOU TO PAY FOR THE VERY BASICS LIKE FOOD, HOUSING, MEDICAL CARE, AND HEATING?: NOT HARD AT ALL

## 2023-10-16 SDOH — ECONOMIC STABILITY: FOOD INSECURITY: WITHIN THE PAST 12 MONTHS, YOU WORRIED THAT YOUR FOOD WOULD RUN OUT BEFORE YOU GOT MONEY TO BUY MORE.: NEVER TRUE

## 2023-10-16 ASSESSMENT — ENCOUNTER SYMPTOMS
SINUS PAIN: 0
FACIAL SWELLING: 0
SINUS PRESSURE: 1
WHEEZING: 0
TROUBLE SWALLOWING: 0
STRIDOR: 0
SINUS COMPLAINT: 1
COUGH: 1
VOICE CHANGE: 0
SHORTNESS OF BREATH: 0
GASTROINTESTINAL NEGATIVE: 1

## 2023-10-16 ASSESSMENT — PATIENT HEALTH QUESTIONNAIRE - PHQ9
4. FEELING TIRED OR HAVING LITTLE ENERGY: 0
SUM OF ALL RESPONSES TO PHQ QUESTIONS 1-9: 0
SUM OF ALL RESPONSES TO PHQ QUESTIONS 1-9: 0
9. THOUGHTS THAT YOU WOULD BE BETTER OFF DEAD, OR OF HURTING YOURSELF: 0
1. LITTLE INTEREST OR PLEASURE IN DOING THINGS: 0
7. TROUBLE CONCENTRATING ON THINGS, SUCH AS READING THE NEWSPAPER OR WATCHING TELEVISION: 0
2. FEELING DOWN, DEPRESSED OR HOPELESS: 0
10. IF YOU CHECKED OFF ANY PROBLEMS, HOW DIFFICULT HAVE THESE PROBLEMS MADE IT FOR YOU TO DO YOUR WORK, TAKE CARE OF THINGS AT HOME, OR GET ALONG WITH OTHER PEOPLE: 0
3. TROUBLE FALLING OR STAYING ASLEEP: 0
5. POOR APPETITE OR OVEREATING: 0
SUM OF ALL RESPONSES TO PHQ QUESTIONS 1-9: 0
SUM OF ALL RESPONSES TO PHQ QUESTIONS 1-9: 0
8. MOVING OR SPEAKING SO SLOWLY THAT OTHER PEOPLE COULD HAVE NOTICED. OR THE OPPOSITE, BEING SO FIGETY OR RESTLESS THAT YOU HAVE BEEN MOVING AROUND A LOT MORE THAN USUAL: 0
SUM OF ALL RESPONSES TO PHQ9 QUESTIONS 1 & 2: 0
6. FEELING BAD ABOUT YOURSELF - OR THAT YOU ARE A FAILURE OR HAVE LET YOURSELF OR YOUR FAMILY DOWN: 0

## 2023-10-16 NOTE — PROGRESS NOTES
Jocelyn Carvalho is a 48 y.o. male who presents to the office today with the following:  Chief Complaint   Patient presents with    Sinus Problem     Home covid negative yesterday       Sinus Problem  Associated symptoms include chills, congestion, coughing and sinus pressure. Pertinent negatives include no ear pain or shortness of breath. Neg covid test yesterday. Feels he has a sinus infection. Has felt sick for about 10 days. Happens this time of year annually. Happens about twice a year. Has a lot of pressure in head, stuffy nose, pressure in sinuses/non-tender, hearing clicking, lot of green mucus, a night gets chills/achy. He feels throat hurts from drainage. Coughing frequently, starting to bring up green phlegm. No fevers, but has felt feverish. Taking Motrin, drinking tea, sudafed. No sick contacts. Is a  and has to go back out soon. Allergic to PCN. Has done well with zpack and clindamycin. Does have to work out in sun. Review of Systems   Constitutional:  Positive for chills. Negative for fever. HENT:  Positive for congestion, postnasal drip and sinus pressure. Negative for ear pain, facial swelling, sinus pain, trouble swallowing and voice change. Respiratory:  Positive for cough. Negative for shortness of breath, wheezing and stridor. Cardiovascular: Negative. Gastrointestinal: Negative. See HPI. History reviewed. No pertinent past medical history.     Past Surgical History:   Procedure Laterality Date    SHOULDER ARTHROSCOPY Left 2002       Allergies   Allergen Reactions    Penicillins      Other reaction(s): Unknown (comments)       Current Outpatient Medications   Medication Sig Dispense Refill    clindamycin (CLEOCIN) 300 MG capsule Take 1 capsule by mouth 3 times daily for 7 days 21 capsule 0    ipratropium (ATROVENT) 0.06 % nasal spray USE 1 SPRAY IN EACH NOSTRIL FOUR TIMES DAILY FOR RUNNY NOSE      metoprolol succinate (TOPROL XL) 25 MG extended